# Patient Record
Sex: FEMALE | Race: BLACK OR AFRICAN AMERICAN | NOT HISPANIC OR LATINO | Employment: OTHER | URBAN - METROPOLITAN AREA
[De-identification: names, ages, dates, MRNs, and addresses within clinical notes are randomized per-mention and may not be internally consistent; named-entity substitution may affect disease eponyms.]

---

## 2017-03-22 NOTE — PATIENT DISCUSSION
Keratoconus Counseling: The diagnosis and its pathophysiology has been explained to the patient. 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 They have been informed that they are not a candidate for refractive surgery. Keratoconus may be worsened by eye rubbing and ocular allergies. Treatment options for ocular allergies include topical antihistamines and mast cell stabilizers, oral antihistamines, and possibly allergy shots. Treatment options for keratoconus include cessation of eye rubbing, contact lens, collagen crosslinking, and /or corneal inlays. Corneal transplant surgery is a surgical option for the treatment of keratoconus, but is only performed when other treatment options have failed. Return for follow-up as scheduled.

## 2017-03-22 NOTE — PATIENT DISCUSSION
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 RECOMMEND CESSATION OF EYE RUBBING.  CONSULT DR ALBRECHT FOR  [facility] REFITTING

## 2017-05-26 NOTE — PATIENT DISCUSSION
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  RETINAL DETACHMENT SIGNS AND SYMPTOMS REVIEWED

## 2017-12-14 ENCOUNTER — ESTABLISHED COMPREHENSIVE EXAM (OUTPATIENT)
Dept: URBAN - METROPOLITAN AREA CLINIC 39 | Facility: CLINIC | Age: 82
End: 2017-12-14

## 2017-12-14 DIAGNOSIS — H43.813: ICD-10-CM

## 2017-12-14 DIAGNOSIS — Z96.1: ICD-10-CM

## 2017-12-14 DIAGNOSIS — H35.362: ICD-10-CM

## 2017-12-14 DIAGNOSIS — E11.9: ICD-10-CM

## 2017-12-14 DIAGNOSIS — H04.123: ICD-10-CM

## 2017-12-14 PROCEDURE — 92014 COMPRE OPH EXAM EST PT 1/>: CPT

## 2017-12-14 PROCEDURE — 92015 DETERMINE REFRACTIVE STATE: CPT

## 2017-12-14 RX ORDER — BUTALBITAL, ASPIRIN, CAFFEINE AND CODEINE PHOSPHATE 30; 50; 40; 325 MG/1; MG/1; MG/1; MG/1
1 CAPSULE ORAL TWICE A DAY
Start: 2017-12-14

## 2017-12-14 ASSESSMENT — VISUAL ACUITY
OS_CC: 20/60
OS_SC: 20/70
OD_SC: J8
OD_CC: J1
OD_SC: 20/40
OD_CC: 20/40
OS_SC: J6
OS_CC: J1

## 2018-12-13 ENCOUNTER — ESTABLISHED COMPREHENSIVE EXAM (OUTPATIENT)
Dept: URBAN - METROPOLITAN AREA CLINIC 39 | Facility: CLINIC | Age: 83
End: 2018-12-13

## 2018-12-13 DIAGNOSIS — Z96.1: ICD-10-CM

## 2018-12-13 DIAGNOSIS — H04.123: ICD-10-CM

## 2018-12-13 DIAGNOSIS — E11.9: ICD-10-CM

## 2018-12-13 PROCEDURE — 92014 COMPRE OPH EXAM EST PT 1/>: CPT

## 2018-12-13 ASSESSMENT — TONOMETRY
OS_IOP_MMHG: 16
OD_IOP_MMHG: 13

## 2018-12-13 ASSESSMENT — VISUAL ACUITY
OD_CC: 20/60
OS_CC: 20/50

## 2019-01-29 NOTE — PATIENT DISCUSSION
New Prescription: Fab 128 (sodium chloride): ointment: 5% a small amount at bedtime into both eyes 01-

## 2019-01-29 NOTE — PATIENT DISCUSSION
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 RECOMMEND CESSATION OF EYE RUBBING.  REFER TO [facility] FOR [facility] RX.

## 2019-01-29 NOTE — PATIENT DISCUSSION
New Prescription: neomycin-polymyxin-dexameth (neomycin-polymyxin-dexameth): drops,suspension: 3.5-10,000-0.1 mg/mL-unit/mL-% 1 drop twice a day as directed into affected eye 01-

## 2019-01-29 NOTE — PATIENT DISCUSSION
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 SPECS AND PACHY DONE TODAY. ADD FRAN PB QHS OU. REPEAT SPECS AND PACHY AT NEXT VISIT.

## 2019-01-29 NOTE — PATIENT DISCUSSION
MODERATE DRY EYES: PRESCRIBED PRESERVATIVE FREE ARTIFICIAL TEARS 4-6X A DAY, OU AND THE DAILY INTAKE OF OMEGA-3 DHA/EPA FATTY ACIDS TO HELP RELIEVE SYMPTOMS. ADD NIGHTLY LUBRICATING OINTMENT OR GEL. ADD MAXITROL GTTS BID X 2 WEEKS. WILL CONSIDER RESTASIS OR PUNCTAL PLUGS AND/OR LIPIFLOW TREATMENT NEXT VISIT IF NOT RESPONSIVE OR IF SYMPTOMS PERSIST. RETURN FOR FOLLOW-UP AS SCHEDULED OR SOONER IF SYMPTOMS WORSEN.

## 2019-01-29 NOTE — PATIENT DISCUSSION
CATARACTS, OU - STARTING TO BE VISUALLY SIGNIFICANT. PT TO CALL WHEN READY TO PROCEED WITH SURGERY.  FOLLOW

## 2020-08-05 NOTE — PATIENT DISCUSSION
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 BASELINE DISC PHOTOS AND GONIO DONE TODAY. CONTINUE LUMIGAN QHS OU. STOP TIMOLOL OD QAM. DISCUSSED CONTINUING GTTS VS ADDING SLT.

## 2020-08-05 NOTE — PATIENT DISCUSSION
Pigment Dispersion Syndrome (PDS) Counseling: The nature of the diagnosis and pathophysiology of iridozonular contact was discussed with the patient, including associated symptoms such as blurred vision, eye pain, and colored halos around lights after exercise. I discussed treatment options and risk of permanent vision loss from glaucoma. The patient understands and will follow up as scheduled or sooner if symptomatic.

## 2020-08-21 NOTE — PATIENT DISCUSSION
PIGMENT DISPERSION SYNDROME, OU: (DX BY DR ERNST) - DISCUSSED VF AND RNFL WITH PT. CONTINUE LUMIGAN QHS OU.

## 2020-11-07 NOTE — PATIENT DISCUSSION
Continue: Lumigan (bimatoprost): drops: 0.01%
Continue: Refresh Optive (carboxymethylcellulose-glycern): drops: 0.5-0.9%
Diagnosis:Pigmentary glaucoma, left eye, moderate stage
Laser trabeculoplasty (SLT or ALT): OS
Medications:
Procedures:
Speaking Coherently

## 2021-01-29 NOTE — PATIENT DISCUSSION
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 CONTINUE LUMIGAN QHS OU. DISCUSSED CONTINUING GTTS VS ADDING SLT.

## 2021-05-11 NOTE — PATIENT DISCUSSION
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 CONTINUE LUMIGAN QHS OU. DISCUSSED CONTINUING GTTS VS ALTERNATIVE OPTIONS. REVIEWED VF WITH PATIENT TODAY. FOLLOW.

## 2021-11-17 NOTE — PATIENT DISCUSSION
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 CONTINUE LUMIGAN QHS OU. DISCUSSED CONTINUING GTTS VS ALTERNATIVE OPTIONS.

## 2022-03-10 NOTE — PATIENT DISCUSSION
Ed.patient. Rx. Maxitrol TID OD x 4 days then d/c. Patient to remain out of CL's until eye clears. Patient to call back if symptoms don't improve or worsen. Monitor.

## 2022-06-20 NOTE — PATIENT DISCUSSION
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 CONTINUE [facility] Q OU.

## 2022-06-20 NOTE — PATIENT DISCUSSION
Mild Blepharitis possibly from Lumigan. Discussed alternative options vs continuing gtts. Stressed importance of warm compresses.

## 2023-01-03 NOTE — PATIENT DISCUSSION
Advised pt of findings. Use warm compresses OU BID for 5-10 minutes each time. Use AT OU QID. Use Ocusoft eyelid cleansers or baby shampoo to cleanse lash line daily. show

## 2023-01-03 NOTE — PATIENT DISCUSSION
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 Pt concern for bags under her eyes due to lumigan. Ok to trial on Carteolol BID OU.

## 2023-02-10 NOTE — PATIENT DISCUSSION
Posterior Vitreous Detachment Counseling: I have discussed the diagnosis of PVD with the patient and the possibility of a retinal tear or detachment. The signs/symptoms of a retinal tear/detachment were reviewed to include but not limited to redness, discharge, pain, increase or change in flashes of light, increase or change in floaters, decreased vision, part of the vision missing, veils or any other ocular concerns. I have further explained not all patients who develop a tear or detachment have notable symptoms, therefore, compliance with return visits are necessary. The patient was instructed to contact us immediately if they noticed any of the signs or symptoms of retinal detachment as noted above as the prognosis for any potential treatment options may be time related. Return for follow-up as scheduled. Document As Units Or Cc?: units

## 2025-02-07 NOTE — PATIENT DISCUSSION
Diagnosis:Pigmentary glaucoma, right eye, mild stage Patient complains of constipation  Patient reports 2 very small bowel movements this morning  Is currently on MiraLax 17 g and senna 12.2 tab, 2 tabs BID-consider adding suppository  Encourage adequate p.o. Hydration  Encourage prune juice as tolerated  Encourage ambulation as tolerated.